# Patient Record
Sex: MALE
[De-identification: names, ages, dates, MRNs, and addresses within clinical notes are randomized per-mention and may not be internally consistent; named-entity substitution may affect disease eponyms.]

---

## 2022-10-31 ENCOUNTER — DASHBOARD ENCOUNTERS (OUTPATIENT)
Age: 32
End: 2022-10-31

## 2022-10-31 ENCOUNTER — CLAIMS CREATED FROM THE CLAIM WINDOW (OUTPATIENT)
Dept: URBAN - METROPOLITAN AREA CLINIC 113 | Facility: CLINIC | Age: 32
End: 2022-10-31
Payer: COMMERCIAL

## 2022-10-31 ENCOUNTER — WEB ENCOUNTER (OUTPATIENT)
Dept: URBAN - METROPOLITAN AREA CLINIC 113 | Facility: CLINIC | Age: 32
End: 2022-10-31

## 2022-10-31 VITALS
RESPIRATION RATE: 18 BRPM | TEMPERATURE: 97.1 F | BODY MASS INDEX: 34.22 KG/M2 | HEIGHT: 70 IN | HEART RATE: 64 BPM | WEIGHT: 239 LBS | DIASTOLIC BLOOD PRESSURE: 83 MMHG | SYSTOLIC BLOOD PRESSURE: 121 MMHG

## 2022-10-31 DIAGNOSIS — R74.8 ELEVATED LIVER ENZYMES: ICD-10-CM

## 2022-10-31 PROCEDURE — 99243 OFF/OP CNSLTJ NEW/EST LOW 30: CPT | Performed by: INTERNAL MEDICINE

## 2022-10-31 PROCEDURE — 99203 OFFICE O/P NEW LOW 30 MIN: CPT | Performed by: INTERNAL MEDICINE

## 2022-10-31 NOTE — HPI-TODAY'S VISIT:
33 y/o male referred by Dr. Tonja Johnston for elevated liver enzymes. He has had an abdominal ultrasound performed which demonstrated increased hepatic echogenicity consistent with hepatocellular disease. He has had labs performed in May 2022. He was found have an AST of 61 and an ALT of 124. His platelet count is 196.  His hepatitis screening was negative. He does have a family hx of liver cancer (uncles). He does not drink ETOH and has went on a diet. He has lost some weight. his goal is 200 lbs. He was 265 lbs; he has lost 15 lbs since then.

## 2022-11-10 LAB
A/G RATIO: 1.5
ACTIN (SMOOTH MUSCLE) ANTIBODY (IGG): <20
ALBUMIN: 4.3
ALKALINE PHOSPHATASE: 70
ALPHA-1-ANTITRYPSIN (AAT) PHENOTYPE: (no result)
ALT (SGPT): 162
ANA SCREEN, IFA: NEGATIVE
AST (SGOT): 79
BILIRUBIN, TOTAL: 0.6
BUN/CREATININE RATIO: (no result)
BUN: 11
CALCIUM: 8.9
CARBON DIOXIDE, TOTAL: 25
CERULOPLASMIN: 24
CHLORIDE: 105
CREATININE: 0.91
EGFR: 116
FERRITIN, SERUM: 421
GLOBULIN, TOTAL: 2.9
GLUCOSE: 82
HEP A AB, IGM: (no result)
HEP B CORE AB, IGM: (no result)
HEPATITIS A AB, TOTAL: REACTIVE
HEPATITIS B CORE AB TOTAL: (no result)
HEPATITIS B SURFACE AB IMMUNITY, QN: 121
HEPATITIS B SURFACE ANTIGEN: (no result)
HEPATITIS C ANTIBODY: (no result)
IMMUNOGLOBULIN A: 399
IMMUNOGLOBULIN G: 1289
IMMUNOGLOBULIN M: 106
INDEX: 0.07
IRON BIND.CAP.(TIBC): 259
IRON SATURATION: 48
IRON: 125
LKM-1 ANTIBODY (IGG): <=20
MITOCHONDRIAL (M2) ANTIBODY: <=20
POTASSIUM: 3.8
PROTEIN, TOTAL: 7.2
SODIUM: 142
T-TRANSGLUTAMINASE (TTG) IGA: <1